# Patient Record
Sex: MALE | URBAN - METROPOLITAN AREA
[De-identification: names, ages, dates, MRNs, and addresses within clinical notes are randomized per-mention and may not be internally consistent; named-entity substitution may affect disease eponyms.]

---

## 2022-01-19 ENCOUNTER — OFFICE VISIT (OUTPATIENT)
Dept: URBAN - METROPOLITAN AREA CLINIC 3 | Facility: CLINIC | Age: 47
End: 2022-01-19

## 2022-01-19 DIAGNOSIS — H43.11 VITREOUS HEMORRHAGE, RIGHT EYE: Primary | ICD-10-CM

## 2022-01-19 DIAGNOSIS — H25.813 COMBINED FORMS OF AGE-RELATED CATARACT, BILATERAL: ICD-10-CM

## 2022-01-19 DIAGNOSIS — E11.3513 TYPE 2 DIAB WITH PROLIF DIAB RTNOP WITH MACULAR EDEMA, BI: ICD-10-CM

## 2022-01-19 PROCEDURE — 92004 COMPRE OPH EXAM NEW PT 1/>: CPT | Performed by: OPHTHALMOLOGY

## 2022-01-19 ASSESSMENT — INTRAOCULAR PRESSURE
OS: 18
OD: 17

## 2022-01-20 NOTE — IMPRESSION/PLAN
Impression: Type 2 diab with prolif diab rtnop with macular edema, bi: N57.3088. Plan: Diabetes type II: appears to have PDR with [facility]. Discussed the patient's vision complaints and how complaints are related to the lack of blood sugar control. Discussed ocular and systemic benefits of blood sugar control. Gave referral to [address] [place] [address] for further evaluation and treatment. RTC once discharged from their care.

## 2022-01-20 NOTE — IMPRESSION/PLAN
Impression: Vitreous hemorrhage, right eye: H43.11. Plan: No view to fundus. Most likely related to Proliferative diabetic retinopathy. Recommend strict blood pressure and blood glucose control. Keep HOB elevated. To retina for further evaluation and treatment.